# Patient Record
Sex: FEMALE | Race: WHITE | NOT HISPANIC OR LATINO | URBAN - METROPOLITAN AREA
[De-identification: names, ages, dates, MRNs, and addresses within clinical notes are randomized per-mention and may not be internally consistent; named-entity substitution may affect disease eponyms.]

---

## 2020-02-04 ENCOUNTER — EMERGENCY (EMERGENCY)
Facility: HOSPITAL | Age: 35
LOS: 1 days | Discharge: HOME | End: 2020-02-04
Attending: EMERGENCY MEDICINE | Admitting: EMERGENCY MEDICINE
Payer: COMMERCIAL

## 2020-02-04 VITALS
DIASTOLIC BLOOD PRESSURE: 107 MMHG | SYSTOLIC BLOOD PRESSURE: 154 MMHG | TEMPERATURE: 98 F | OXYGEN SATURATION: 98 % | HEART RATE: 119 BPM | WEIGHT: 138.01 LBS | HEIGHT: 64 IN | RESPIRATION RATE: 18 BRPM

## 2020-02-04 LAB
ALBUMIN SERPL ELPH-MCNC: 4 G/DL — SIGNIFICANT CHANGE UP (ref 3.4–5)
ALP SERPL-CCNC: 32 U/L — LOW (ref 40–120)
ALT FLD-CCNC: 27 U/L — SIGNIFICANT CHANGE UP (ref 12–42)
ANION GAP SERPL CALC-SCNC: 12 MMOL/L — SIGNIFICANT CHANGE UP (ref 9–16)
APTT BLD: 26.8 SEC — LOW (ref 27.5–36.3)
AST SERPL-CCNC: 16 U/L — SIGNIFICANT CHANGE UP (ref 15–37)
BILIRUB SERPL-MCNC: 0.8 MG/DL — SIGNIFICANT CHANGE UP (ref 0.2–1.2)
BUN SERPL-MCNC: 15 MG/DL — SIGNIFICANT CHANGE UP (ref 7–23)
CALCIUM SERPL-MCNC: 9 MG/DL — SIGNIFICANT CHANGE UP (ref 8.5–10.5)
CHLORIDE SERPL-SCNC: 100 MMOL/L — SIGNIFICANT CHANGE UP (ref 96–108)
CK SERPL-CCNC: 92 U/L — SIGNIFICANT CHANGE UP (ref 26–192)
CO2 SERPL-SCNC: 26 MMOL/L — SIGNIFICANT CHANGE UP (ref 22–31)
CREAT SERPL-MCNC: 0.81 MG/DL — SIGNIFICANT CHANGE UP (ref 0.5–1.3)
ETHANOL SERPL-MCNC: <3 MG/DL — SIGNIFICANT CHANGE UP
GLUCOSE SERPL-MCNC: 111 MG/DL — HIGH (ref 70–99)
HCT VFR BLD CALC: 40.7 % — SIGNIFICANT CHANGE UP (ref 34.5–45)
HGB BLD-MCNC: 14.2 G/DL — SIGNIFICANT CHANGE UP (ref 11.5–15.5)
INR BLD: 1.03 — SIGNIFICANT CHANGE UP (ref 0.88–1.16)
MAGNESIUM SERPL-MCNC: 1.9 MG/DL — SIGNIFICANT CHANGE UP (ref 1.6–2.6)
MCHC RBC-ENTMCNC: 30.1 PG — SIGNIFICANT CHANGE UP (ref 27–34)
MCHC RBC-ENTMCNC: 34.9 GM/DL — SIGNIFICANT CHANGE UP (ref 32–36)
MCV RBC AUTO: 86.2 FL — SIGNIFICANT CHANGE UP (ref 80–100)
NRBC # BLD: 0 /100 WBCS — SIGNIFICANT CHANGE UP (ref 0–0)
PLATELET # BLD AUTO: 174 K/UL — SIGNIFICANT CHANGE UP (ref 150–400)
POTASSIUM SERPL-MCNC: 3.4 MMOL/L — LOW (ref 3.5–5.3)
POTASSIUM SERPL-SCNC: 3.4 MMOL/L — LOW (ref 3.5–5.3)
PROT SERPL-MCNC: 7.6 G/DL — SIGNIFICANT CHANGE UP (ref 6.4–8.2)
PROTHROM AB SERPL-ACNC: 11.5 SEC — SIGNIFICANT CHANGE UP (ref 10–12.9)
RBC # BLD: 4.72 M/UL — SIGNIFICANT CHANGE UP (ref 3.8–5.2)
RBC # FLD: 12.5 % — SIGNIFICANT CHANGE UP (ref 10.3–14.5)
SODIUM SERPL-SCNC: 138 MMOL/L — SIGNIFICANT CHANGE UP (ref 132–145)
TROPONIN I SERPL-MCNC: <0.017 NG/ML — LOW (ref 0.02–0.06)
TSH SERPL-MCNC: 1.32 UIU/ML — SIGNIFICANT CHANGE UP (ref 0.36–3.74)
WBC # BLD: 11.97 K/UL — HIGH (ref 3.8–10.5)
WBC # FLD AUTO: 11.97 K/UL — HIGH (ref 3.8–10.5)

## 2020-02-04 PROCEDURE — 93010 ELECTROCARDIOGRAM REPORT: CPT

## 2020-02-04 PROCEDURE — 71046 X-RAY EXAM CHEST 2 VIEWS: CPT | Mod: 26

## 2020-02-04 PROCEDURE — 90792 PSYCH DIAG EVAL W/MED SRVCS: CPT | Mod: GT

## 2020-02-04 PROCEDURE — 99285 EMERGENCY DEPT VISIT HI MDM: CPT

## 2020-02-04 NOTE — ED PROVIDER NOTE - CLINICAL SUMMARY MEDICAL DECISION MAKING FREE TEXT BOX
Pt presents to the ED for evaluation of visual hallucinations, anxiety and insomnia in the setting of Adderall and cocaine abuse. Will check basic labs, UA, Utox. Will consult telepsych. Pt presents to the ED for evaluation of visual hallucinations, anxiety and insomnia in the setting of Adderall and cocaine abuse. Will check basic labs, UA, Utox. Will consult telepsych.  Cleared by telepsych, trop and EKG reviewed, follow up with primary care and her private psychiatrist in 24-48 hours.

## 2020-02-04 NOTE — ED ADULT NURSE REASSESSMENT NOTE - NS ED NURSE REASSESS COMMENT FT1
post speaking with Dr MOHAMUD, pt now on 1-1 with pct Shawn constant obs until further notice, as pt visually hallucinating admits to  as per  that she has taken more than prescribed Adderal and cocaine

## 2020-02-04 NOTE — ED ADULT TRIAGE NOTE - CHIEF COMPLAINT QUOTE
walk in pt with complaints of chest pain and diaphoresis today. Reports she hasn't slept in two days, has been on and off of adderral since college. Is worried she is addicted, anxious in triage and tearful.

## 2020-02-04 NOTE — ED BEHAVIORAL HEALTH ASSESSMENT NOTE - SUMMARY
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 Patient denies current AVH and does not appear psychotic. Denies any current SI/HI. Would benefit from establishing outpatient psychiatry to monitor adderall and substance use

## 2020-02-04 NOTE — ED ADULT NURSE NOTE - OBJECTIVE STATEMENT
Bib sister Anita who states pt has become increaasingly paranoid over past few days, worseining today, unsure if she has od on her adderal pt unsure if she has either denies suicidal ideation, pt calm and compliant at present

## 2020-02-04 NOTE — ED PROVIDER NOTE - OBJECTIVE STATEMENT
35 yo F w/ PMHx/PSHx of ADD, right knee arthroscopy presents to the ED with panic attack, visual hallucinations, and insomnia. Pt admits to abusing Adderall and cocaine - last cocaine use 2 days ago, last Adderall yesterday @ 4:30 PM. Pt states she hasn't been able to sleep in 2 days. Denies auditory hallucinations, SI/HI, N/V, paranoia, SOB, palpitations, numbness, tingling, paresthesia, HA, dizziness, neck pain,  abdominal pain. Pt denies smoking/vaping history. pt states PMD prescribed extended release Adderall 25, but pt admits to taking multiple doses at a time. Pt's sister is at bedside to provide collateral.   Psychiatry: Dr Bond, Dr Cara Boyle

## 2020-02-04 NOTE — ED BEHAVIORAL HEALTH ASSESSMENT NOTE - RISK ASSESSMENT
risk factors: substance use   protective factors: no current or past SI, no prior psych admissions, future oriented, supportive family Low Acute Suicide Risk

## 2020-02-04 NOTE — ED BEHAVIORAL HEALTH ASSESSMENT NOTE - HPI (INCLUDE ILLNESS QUALITY, SEVERITY, DURATION, TIMING, CONTEXT, MODIFYING FACTORS, ASSOCIATED SIGNS AND SYMPTOMS)
33 yo F w/ PMHx/PSHx of ADD, right knee arthroscopy presents to the ED with panic attack, visual hallucinations, and insomnia. Pt admits to abusing Adderall and cocaine - last cocaine use 2 days ago, last Adderall yesterday @ 4:30 PM. Pt states she hasn't been able to sleep in 2 days. Denies auditory hallucinations, SI/HI, N/V, paranoia, SOB, palpitations, numbness, tingling, paresthesia, HA, dizziness, neck pain,  abdominal pain. Pt denies smoking/vaping history. pt states PMD prescribed extended release Adderall 25, but pt admits to taking multiple doses at a time. Pt's sister is at bedside to provide collateral. Per ED note, "33 yo F w/ PMHx/PSHx of ADD, right knee arthroscopy presents to the ED with panic attack, visual hallucinations, and insomnia. Pt admits to abusing Adderall and cocaine - last cocaine use 2 days ago, last Adderall yesterday @ 4:30 PM. Pt states she hasn't been able to sleep in 2 days. Denies auditory hallucinations, SI/HI, N/V, paranoia, SOB, palpitations, numbness, tingling, paresthesia, HA, dizziness, neck pain,  abdominal pain. Pt denies smoking/vaping history. pt states PMD prescribed extended release Adderall 25, but pt admits to taking multiple doses at a time. 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 Patient reports that she has not been able to sleep since Monday morning (didn't' sleep all last night). Reports that she last used cocaine on Sunday evening at a [employer] party and last used Adderall at 4:00 pm yesterday (took 25 mg XR). Today, patient reports that she was at lunch with a friend and started to feel paranoid that her family was conducting an intervention because they were concerned about her stimulant use. She also was feeling that her brain was adversely affected by all the adderall. On interview, she is denying any current AVH and does not appear to be responding to internal stimuli and has been linear and logical.     Patient reports that she has been on and off stimulants since college but has started overtaking/abusing them for the last couple of months. Reports that she started doubling her prescription because she felt like she need more to work, to communicate, and to be efficient. 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 Used cocaine on Sunday, but reports last use was in October 2019. Her alcohol use has increased recently as well (drank from Thursday to Sunday) but usually does not drink on weekdays. She denies any current or past suicidal thoughts, behaviors, or intentions. Denies any prior SA. Denies any current manic or psychotic symptoms. Denies any other substance use.    Her adderall is being prescribed by her PCP, patient recently had two consultations earlier this month by psychiatrist but didn't follow up yet. Utox positive for cocaine and amphetamine. BAL negative          Rx Written	Rx Dispensed	Drug	Quantity	Days Supply	Prescriber Name  01/29/2020	01/29/2020	adderall xr 25 mg capsule	30	30	David Winchester MD  10/15/2019	10/16/2019	adderall xr 25 mg capsule	30	30	David Winchester MD  09/16/2019	09/18/2019	adderall xr 25 mg capsule	30	30	David Winchester MD  07/19/2019	07/19/2019	adderall xr 25 mg capsule	30	30	David Winchester MD

## 2020-02-04 NOTE — ED PROVIDER NOTE - NSFOLLOWUPINSTRUCTIONS_ED_ALL_ED_FT
Please see your primary care doctor and your psychiatrist in 24 to 48 hours.  Please take medication exactly as prescribed.  Please avoid alcohol and any non-prescribed drugs.  Please return if you have any concerns at any time.

## 2020-02-04 NOTE — ED PROVIDER NOTE - PATIENT PORTAL LINK FT
You can access the FollowMyHealth Patient Portal offered by [facility] by registering at the following website: [email or web address]. By joining [facility]’s FollowMyHealth portal, you will also be able to view your health information using other applications (apps) compatible with our system.

## 2020-02-04 NOTE — ED BEHAVIORAL HEALTH ASSESSMENT NOTE - DESCRIPTION
Date: 2/4/2020 Time: 23:24  Clinician Name: Deann Pt Location: [facility]  Pt Name: Karma Becker  Pt MRN: [id number]    Records checked/sent to [facility] – with data: [place] [facility], google search    [email or web address]     Records checked- no data: webcrims, nysdoccslookup, [facility] [facility], [facility] [facility], [facility], [facility] Visits, [facility], [facility], [facility], [facility], [facility], [facility], [facility], [facility], [facility], [facility], [facility] (medical visits), [facility], [facility]        see free text for ED course. none see hpi

## 2020-02-05 VITALS
RESPIRATION RATE: 19 BRPM | TEMPERATURE: 98 F | OXYGEN SATURATION: 98 % | HEART RATE: 80 BPM | DIASTOLIC BLOOD PRESSURE: 86 MMHG | SYSTOLIC BLOOD PRESSURE: 130 MMHG

## 2020-02-05 DIAGNOSIS — F19.94 OTHER PSYCHOACTIVE SUBSTANCE USE, UNSPECIFIED WITH PSYCHOACTIVE SUBSTANCE-INDUCED MOOD DISORDER: ICD-10-CM

## 2020-02-05 LAB
APPEARANCE UR: CLEAR — SIGNIFICANT CHANGE UP
BILIRUB UR-MCNC: NEGATIVE — SIGNIFICANT CHANGE UP
COLOR SPEC: YELLOW — SIGNIFICANT CHANGE UP
DIFF PNL FLD: ABNORMAL
GLUCOSE UR QL: NEGATIVE — SIGNIFICANT CHANGE UP
HCG UR QL: NEGATIVE — SIGNIFICANT CHANGE UP
KETONES UR-MCNC: 15 MG/DL
LEUKOCYTE ESTERASE UR-ACNC: NEGATIVE — SIGNIFICANT CHANGE UP
NITRITE UR-MCNC: NEGATIVE — SIGNIFICANT CHANGE UP
PCP SPEC-MCNC: SIGNIFICANT CHANGE UP
PH UR: 6 — SIGNIFICANT CHANGE UP (ref 5–8)
PROT UR-MCNC: NEGATIVE MG/DL — SIGNIFICANT CHANGE UP
SP GR SPEC: <=1.005 — SIGNIFICANT CHANGE UP (ref 1–1.03)
UROBILINOGEN FLD QL: 0.2 E.U./DL — SIGNIFICANT CHANGE UP

## 2020-02-05 NOTE — ED BEHAVIORAL HEALTH NOTE - BEHAVIORAL HEALTH NOTE
===================  PRE-HOSPITAL COURSE  ===================  SOURCE:  Primary RN Alfie  DETAILS:  pt. was BIB sister who reported pt. has been increasingly paranoid/anxious, seemed to be visually hallucinating today        ============  ED COURSE   ============  SOURCE:  Primary RN Alfie  ARRIVAL:  walk-in, accompanied by sister  BELONGINGS:  couldn’t not provide itemized list  BEHAVIOR: calm/cooperative, pleasant, well-dressed, in good hygiene, some delay in response time but speech and thoughts otherwise within normal limits, flat affect, relayed she was seeing hearts at on point   TREATMENT:  no medications given, no security intervention needed   VISITORS:  sister has been at bedside throughout, interactions appear positive/supportive in nature       ========================  FOR EACH COLLATERAL  ========================  NAME:   Anita Becker  NUMBER: at bedside   RELATIONSHIP: sister   RELIABILITY: high  COMMENTS: Sister reports she is not concerned that pt. is an acute risk to herself or others, feels pt. will be safe to return home      ========================  PATIENT DEMOGRAPHICS:  ========================  HPI  BASELINE FUNCTIONING: Pt. is an , recently started freelance business. Pt. takes care of ADLs, maintains positive relationships with family and friends.  At baseline pt. prescribed Adderall by PCP 25 mg.   DATE HPI STARTED: Sister reports that about a month ago pt. began seeing a psychiatrist and therapist to establish mental health care   DECOMPENSATION: Pt. supposedly has been abusing her Adderall prescription, per sister pt. taking double amount prescribed and also has been using cocaine.  She reports pt. has been scared because she has been experiencing hallucinations and has been unable to sleep.  Today sister went to see at her apt. which is nearby and encouraged pt. to come to ED, though she indicates pt. was readily willing to come in due to her own fears of how she was feeling.   SUICIDALITY: pertinent negative  VIOLENCE:  pertinent negative  SUBSTANCE: per sister pt. has been doubling her Adderall intake and has been using cocaine, last known cocaine use being Gregorio 2/3    ========================  PAST PSYCHIATRIC HISTORY  ========================  DATE PAST PSYCHIATRIC HISTORY STARTED:  unclear timeframe, sister indicates many years ago   MAIN PSYCHIATRIC DIAGNOSIS: anxiety and depression   PSYCHIATRIC HOSPITALIZATIONS:  pertinent negative  PRIOR ILLNESS:  pertinent negative  SUICIDALITY:  pertinent negative  VIOLENCE:  pertinent negative  SUBSTANCE USE: alcohol socially, cocaine – last use Sunday   ?    ==============  OTHER HISTORY  ==============  CURRENT MEDICATION:  pertinent negative  MEDICAL HISTORY:  pertinent negative  ALLERGIES: pertinent negative  LEGAL ISSUES: pertinent negative  FIREARM ACCESS: pertinent negative  SOCIAL HISTORY/ TRAUMA: pertinent negative   FAMILY HISTORY: pertinent negative

## 2020-02-10 DIAGNOSIS — R07.9 CHEST PAIN, UNSPECIFIED: ICD-10-CM

## 2020-02-10 DIAGNOSIS — R44.1 VISUAL HALLUCINATIONS: ICD-10-CM

## 2020-02-10 DIAGNOSIS — R07.89 OTHER CHEST PAIN: ICD-10-CM

## 2022-12-16 NOTE — ED ADULT NURSE NOTE - DOES PATIENT HAVE ADVANCE DIRECTIVE
[FreeTextEntry1] : Complete 10 days of antibiotic. Provide ibuprofen as needed for pain or fever. If no improvement within 48 hours return for re-evaluation. Follow up in 2-3 wks for tympanometry.\par  No

## 2025-07-02 NOTE — ED BEHAVIORAL HEALTH ASSESSMENT NOTE - GROOMING
[FreeTextEntry1] : 2025. BURT DEL ROSARIO 50 year old female . She presents for birth control f/u.  Pt reports of spotting on Junel for first 2 months, third month went well. She tried Lysteda in the past and found it to not be helpful. Pt is open to trying IUD.  Bp is elevated today.  No HA, no visual changes.   BP is elevated today - repeat is 150/90.   Endo Bx in 3/25 wnl. SHG  wnl. She has known fibroids. Sono in  reveals 6.1 cm left lateral fibroid.  PMH: asthma Meds: Albuterol PRN OBHx: C/S x1 GynHx: Hx of fibroids. Hx ovarian cyst. No Hx of STI,abnl paps, or pelvic infections. SHx: C/S x1, ovarian cystectomy, cardiac ablation, gastric sleeve in 2021 FHx: Paternal aunt breast ca. MGF colon ca. Father prostate ca during his 50s and blood and kidney ca. Mother vulvar skin ca at 74. Denies FHx of ovarian, uterine, pancreatic, prostate cancer. All: PCN Social: Social alcohol, drug, or tobacco use, non-smoker.  [No] : Patient does not have concerns regarding sex Fair